# Patient Record
Sex: MALE | Race: WHITE | Employment: FULL TIME | URBAN - METROPOLITAN AREA
[De-identification: names, ages, dates, MRNs, and addresses within clinical notes are randomized per-mention and may not be internally consistent; named-entity substitution may affect disease eponyms.]

---

## 2019-08-21 ENCOUNTER — APPOINTMENT (OUTPATIENT)
Dept: CT IMAGING | Age: 59
End: 2019-08-21
Attending: EMERGENCY MEDICINE
Payer: COMMERCIAL

## 2019-08-21 ENCOUNTER — HOSPITAL ENCOUNTER (EMERGENCY)
Age: 59
Discharge: HOME | End: 2019-08-21
Attending: EMERGENCY MEDICINE
Payer: COMMERCIAL

## 2019-08-21 VITALS
BODY MASS INDEX: 24.65 KG/M2 | HEIGHT: 73 IN | HEART RATE: 86 BPM | SYSTOLIC BLOOD PRESSURE: 131 MMHG | WEIGHT: 186 LBS | DIASTOLIC BLOOD PRESSURE: 98 MMHG | TEMPERATURE: 98.1 F | RESPIRATION RATE: 14 BRPM | OXYGEN SATURATION: 99 %

## 2019-08-21 DIAGNOSIS — K57.32 SIGMOID DIVERTICULITIS: Primary | ICD-10-CM

## 2019-08-21 LAB
ALBUMIN SERPL-MCNC: 3.7 G/DL (ref 3.5–5)
ALBUMIN/GLOB SERPL: 1.1 {RATIO} (ref 1.2–3.5)
ALP SERPL-CCNC: 50 U/L (ref 50–136)
ALT SERPL-CCNC: 35 U/L (ref 12–65)
ANION GAP SERPL CALC-SCNC: 6 MMOL/L (ref 7–16)
AST SERPL-CCNC: 39 U/L (ref 15–37)
BACTERIA URNS QL MICRO: 0 /HPF
BASOPHILS # BLD: 0 K/UL (ref 0–0.2)
BASOPHILS NFR BLD: 1 % (ref 0–2)
BILIRUB SERPL-MCNC: 1.2 MG/DL (ref 0.2–1.1)
BUN SERPL-MCNC: 13 MG/DL (ref 6–23)
CALCIUM SERPL-MCNC: 9 MG/DL (ref 8.3–10.4)
CASTS URNS QL MICRO: ABNORMAL /LPF
CHLORIDE SERPL-SCNC: 104 MMOL/L (ref 98–107)
CO2 SERPL-SCNC: 26 MMOL/L (ref 21–32)
CREAT SERPL-MCNC: 1.32 MG/DL (ref 0.8–1.5)
CRYSTALS URNS QL MICRO: 0 /LPF
DIFFERENTIAL METHOD BLD: ABNORMAL
EOSINOPHIL # BLD: 0 K/UL (ref 0–0.8)
EOSINOPHIL NFR BLD: 1 % (ref 0.5–7.8)
EPI CELLS #/AREA URNS HPF: ABNORMAL /HPF
ERYTHROCYTE [DISTWIDTH] IN BLOOD BY AUTOMATED COUNT: 12.3 % (ref 11.9–14.6)
GLOBULIN SER CALC-MCNC: 3.5 G/DL (ref 2.3–3.5)
GLUCOSE SERPL-MCNC: 96 MG/DL (ref 65–100)
HCT VFR BLD AUTO: 46 % (ref 41.1–50.3)
HGB BLD-MCNC: 16 G/DL (ref 13.6–17.2)
IMM GRANULOCYTES # BLD AUTO: 0 K/UL (ref 0–0.5)
IMM GRANULOCYTES NFR BLD AUTO: 1 % (ref 0–5)
LIPASE SERPL-CCNC: 103 U/L (ref 73–393)
LYMPHOCYTES # BLD: 0.8 K/UL (ref 0.5–4.6)
LYMPHOCYTES NFR BLD: 25 % (ref 13–44)
MCH RBC QN AUTO: 34 PG (ref 26.1–32.9)
MCHC RBC AUTO-ENTMCNC: 34.8 G/DL (ref 31.4–35)
MCV RBC AUTO: 97.7 FL (ref 79.6–97.8)
MONOCYTES # BLD: 0.5 K/UL (ref 0.1–1.3)
MONOCYTES NFR BLD: 15 % (ref 4–12)
MUCOUS THREADS URNS QL MICRO: ABNORMAL /LPF
NEUTS SEG # BLD: 1.9 K/UL (ref 1.7–8.2)
NEUTS SEG NFR BLD: 57 % (ref 43–78)
NRBC # BLD: 0 K/UL (ref 0–0.2)
PLATELET # BLD AUTO: 235 K/UL (ref 150–450)
PMV BLD AUTO: 10.5 FL (ref 9.4–12.3)
POTASSIUM SERPL-SCNC: 4.1 MMOL/L (ref 3.5–5.1)
PROT SERPL-MCNC: 7.2 G/DL (ref 6.3–8.2)
RBC # BLD AUTO: 4.71 M/UL (ref 4.23–5.6)
RBC #/AREA URNS HPF: ABNORMAL /HPF
SODIUM SERPL-SCNC: 136 MMOL/L (ref 136–145)
WBC # BLD AUTO: 3.4 K/UL (ref 4.3–11.1)
WBC URNS QL MICRO: ABNORMAL /HPF

## 2019-08-21 PROCEDURE — 99284 EMERGENCY DEPT VISIT MOD MDM: CPT | Performed by: EMERGENCY MEDICINE

## 2019-08-21 PROCEDURE — 74011250637 HC RX REV CODE- 250/637: Performed by: EMERGENCY MEDICINE

## 2019-08-21 PROCEDURE — 74177 CT ABD & PELVIS W/CONTRAST: CPT

## 2019-08-21 PROCEDURE — 83690 ASSAY OF LIPASE: CPT

## 2019-08-21 PROCEDURE — 81003 URINALYSIS AUTO W/O SCOPE: CPT | Performed by: EMERGENCY MEDICINE

## 2019-08-21 PROCEDURE — 74011250636 HC RX REV CODE- 250/636: Performed by: EMERGENCY MEDICINE

## 2019-08-21 PROCEDURE — 80053 COMPREHEN METABOLIC PANEL: CPT

## 2019-08-21 PROCEDURE — 74011000258 HC RX REV CODE- 258: Performed by: EMERGENCY MEDICINE

## 2019-08-21 PROCEDURE — 74011636320 HC RX REV CODE- 636/320: Performed by: EMERGENCY MEDICINE

## 2019-08-21 PROCEDURE — 81015 MICROSCOPIC EXAM OF URINE: CPT

## 2019-08-21 PROCEDURE — 85025 COMPLETE CBC W/AUTO DIFF WBC: CPT

## 2019-08-21 PROCEDURE — 96360 HYDRATION IV INFUSION INIT: CPT | Performed by: EMERGENCY MEDICINE

## 2019-08-21 RX ORDER — SODIUM CHLORIDE 9 MG/ML
500 INJECTION, SOLUTION INTRAVENOUS ONCE
Status: COMPLETED | OUTPATIENT
Start: 2019-08-21 | End: 2019-08-21

## 2019-08-21 RX ORDER — CIPROFLOXACIN 500 MG/1
500 TABLET ORAL 2 TIMES DAILY
Qty: 20 TAB | Refills: 0 | Status: SHIPPED | OUTPATIENT
Start: 2019-08-21 | End: 2019-08-31

## 2019-08-21 RX ORDER — CIPROFLOXACIN 500 MG/1
500 TABLET ORAL ONCE
Status: COMPLETED | OUTPATIENT
Start: 2019-08-21 | End: 2019-08-21

## 2019-08-21 RX ORDER — METRONIDAZOLE 500 MG/1
500 TABLET ORAL 3 TIMES DAILY
Qty: 30 TAB | Refills: 0 | Status: SHIPPED | OUTPATIENT
Start: 2019-08-21 | End: 2019-08-31

## 2019-08-21 RX ORDER — METRONIDAZOLE 500 MG/1
500 TABLET ORAL
Status: COMPLETED | OUTPATIENT
Start: 2019-08-21 | End: 2019-08-21

## 2019-08-21 RX ORDER — SODIUM CHLORIDE 9 MG/ML
500 INJECTION, SOLUTION INTRAVENOUS ONCE
Status: DISCONTINUED | OUTPATIENT
Start: 2019-08-21 | End: 2019-08-21

## 2019-08-21 RX ORDER — SODIUM CHLORIDE 0.9 % (FLUSH) 0.9 %
10 SYRINGE (ML) INJECTION
Status: COMPLETED | OUTPATIENT
Start: 2019-08-21 | End: 2019-08-21

## 2019-08-21 RX ADMIN — Medication 10 ML: at 13:55

## 2019-08-21 RX ADMIN — DIATRIZOATE MEGLUMINE AND DIATRIZOATE SODIUM 15 ML: 660; 100 LIQUID ORAL; RECTAL at 12:32

## 2019-08-21 RX ADMIN — SODIUM CHLORIDE 500 ML: 900 INJECTION, SOLUTION INTRAVENOUS at 12:50

## 2019-08-21 RX ADMIN — IOPAMIDOL 100 ML: 755 INJECTION, SOLUTION INTRAVENOUS at 13:55

## 2019-08-21 RX ADMIN — CIPROFLOXACIN 500 MG: 500 TABLET, FILM COATED ORAL at 15:31

## 2019-08-21 RX ADMIN — METRONIDAZOLE 500 MG: 500 TABLET, FILM COATED ORAL at 15:31

## 2019-08-21 RX ADMIN — SODIUM CHLORIDE 100 ML: 900 INJECTION, SOLUTION INTRAVENOUS at 13:55

## 2019-08-21 NOTE — ED PROVIDER NOTES
The history is provided by the patient. Abdominal Pain    This is a new problem. Episode onset: 3 days ago. The problem occurs constantly. The problem has been gradually worsening. Associated with: Nausea and loose stool. The pain is located in the generalized abdominal region, LLQ and LUQ. The quality of the pain is sharp and cramping. The pain is moderate. Associated symptoms include a fever and nausea. Pertinent negatives include no diarrhea, no hematochezia, no melena, no vomiting, no dysuria, no hematuria, no chest pain and no back pain. Nothing worsens the pain. The pain is relieved by nothing. No past medical history on file. No past surgical history on file. No family history on file.     Social History     Socioeconomic History    Marital status:      Spouse name: Not on file    Number of children: Not on file    Years of education: Not on file    Highest education level: Not on file   Occupational History    Not on file   Social Needs    Financial resource strain: Not on file    Food insecurity:     Worry: Not on file     Inability: Not on file    Transportation needs:     Medical: Not on file     Non-medical: Not on file   Tobacco Use    Smoking status: Not on file   Substance and Sexual Activity    Alcohol use: Not on file    Drug use: Not on file    Sexual activity: Not on file   Lifestyle    Physical activity:     Days per week: Not on file     Minutes per session: Not on file    Stress: Not on file   Relationships    Social connections:     Talks on phone: Not on file     Gets together: Not on file     Attends Mandaeism service: Not on file     Active member of club or organization: Not on file     Attends meetings of clubs or organizations: Not on file     Relationship status: Not on file    Intimate partner violence:     Fear of current or ex partner: Not on file     Emotionally abused: Not on file     Physically abused: Not on file     Forced sexual activity: Not on file   Other Topics Concern    Not on file   Social History Narrative    Not on file         ALLERGIES: Patient has no known allergies. Review of Systems   Constitutional: Positive for fever. HENT: Negative for congestion and rhinorrhea. Respiratory: Negative for cough. Cardiovascular: Negative for chest pain. Gastrointestinal: Positive for abdominal pain and nausea. Negative for diarrhea, hematochezia, melena and vomiting. Endocrine: Negative for polyuria. Genitourinary: Negative for dysuria and hematuria. Musculoskeletal: Negative for back pain. Vitals:    08/21/19 1117   BP: (!) 132/95   Pulse: 90   Resp: 16   Temp: 97.9 °F (36.6 °C)   SpO2: 98%   Weight: 84.4 kg (186 lb)   Height: 6' 1\" (1.854 m)            Physical Exam   Constitutional: He is oriented to person, place, and time. He appears well-developed and well-nourished. HENT:   Mouth/Throat: Oropharynx is clear and moist. No oropharyngeal exudate. Eyes: Pupils are equal, round, and reactive to light. Conjunctivae are normal.   Neck: Neck supple. Cardiovascular: Normal rate, regular rhythm and normal heart sounds. Pulmonary/Chest: Effort normal and breath sounds normal.   Abdominal: Soft. Bowel sounds are normal. He exhibits no distension. There is generalized tenderness and tenderness in the left upper quadrant and left lower quadrant. There is no rebound and no guarding. Musculoskeletal: Normal range of motion. He exhibits no edema or tenderness. Lymphadenopathy:     He has no cervical adenopathy. Neurological: He is alert and oriented to person, place, and time. Skin: Skin is warm and dry. Nursing note and vitals reviewed. MDM  Number of Diagnoses or Management Options  Diagnosis management comments: Possible diverticulitis or colitis. CT scan ordered. Mild leukopenia. No other symptoms of sepsis. No pulsatile abdominal mass.   Drinks 3-4 times a week but lipase normal.    2:57 PM  Exam and CT consistent with mild diverticulitis. Cipro and Flagyl prescribed. First dose in ED. Clear liquid diet for 12 to 24 hours and advance as tolerated. Amount and/or Complexity of Data Reviewed  Clinical lab tests: ordered and reviewed (Results for orders placed or performed during the hospital encounter of 08/21/19  -CBC WITH AUTOMATED DIFF       Result                      Value             Ref Range           WBC                         3.4 (L)           4.3 - 11.1 K*       RBC                         4.71              4.23 - 5.6 M*       HGB                         16.0              13.6 - 17.2 *       HCT                         46.0              41.1 - 50.3 %       MCV                         97.7              79.6 - 97.8 *       MCH                         34.0 (H)          26.1 - 32.9 *       MCHC                        34.8              31.4 - 35.0 *       RDW                         12.3              11.9 - 14.6 %       PLATELET                    235               150 - 450 K/*       MPV                         10.5              9.4 - 12.3 FL       ABSOLUTE NRBC               0.00              0.0 - 0.2 K/*       DF                          AUTOMATED                             NEUTROPHILS                 57                43 - 78 %           LYMPHOCYTES                 25                13 - 44 %           MONOCYTES                   15 (H)            4.0 - 12.0 %        EOSINOPHILS                 1                 0.5 - 7.8 %         BASOPHILS                   1                 0.0 - 2.0 %         IMMATURE GRANULOCYTES       1                 0.0 - 5.0 %         ABS. NEUTROPHILS            1.9               1.7 - 8.2 K/*       ABS. LYMPHOCYTES            0.8               0.5 - 4.6 K/*       ABS. MONOCYTES              0.5               0.1 - 1.3 K/*       ABS. EOSINOPHILS            0.0               0.0 - 0.8 K/*       ABS. BASOPHILS              0.0               0.0 - 0.2 K/*       ABS. IMM. GRANS.            0.0               0.0 - 0.5 K/*  -METABOLIC PANEL, COMPREHENSIVE       Result                      Value             Ref Range           Sodium                      136               136 - 145 mm*       Potassium                   4.1               3.5 - 5.1 mm*       Chloride                    104               98 - 107 mmo*       CO2                         26                21 - 32 mmol*       Anion gap                   6 (L)             7 - 16 mmol/L       Glucose                     96                65 - 100 mg/*       BUN                         13                6 - 23 MG/DL        Creatinine                  1.32              0.8 - 1.5 MG*       GFR est AA                  >60               >60 ml/min/1*       GFR est non-AA              59 (L)            >60 ml/min/1*       Calcium                     9.0               8.3 - 10.4 M*       Bilirubin, total            1.2 (H)           0.2 - 1.1 MG*       ALT (SGPT)                  35                12 - 65 U/L         AST (SGOT)                  39 (H)            15 - 37 U/L         Alk.  phosphatase            50                50 - 136 U/L        Protein, total              7.2               6.3 - 8.2 g/*       Albumin                     3.7               3.5 - 5.0 g/*       Globulin                    3.5               2.3 - 3.5 g/*       A-G Ratio                   1.1 (L)           1.2 - 3.5      -LIPASE       Result                      Value             Ref Range           Lipase                      103               73 - 393 U/L   -URINE MICROSCOPIC       Result                      Value             Ref Range           WBC                         3-5               0 /hpf              RBC                         0-3               0 /hpf              Epithelial cells            0-3               0 /hpf              Bacteria                    0                 0 /hpf              Casts                       20-50             0 /lpf              Crystals, urine             0                 0 /LPF              Mucus                       1+ (H)            0 /lpf         )  Tests in the radiology section of CPT®: ordered and reviewed (Ct Abd Pelv W Cont    Result Date: 8/21/2019  CLINICAL HISTORY: The patient is a 64years year old Male presenting with symptoms of left Sided abdominal pain, history of fever-diverticulitis? Technique: Thin slice axial images were obtained through the abdomen and pelvis with intravenous and with oral contrast.  Coronal reformatted images were also provided for review. A total of 100 ml of Iopamidol (ISOVUE-370) 76 % contrast was administered intravenously. All CT scans at this facility are performed using dose reduction/dose modulation techniques, as appropriate the performed exam, including the following: Automated Exposure Control; Adjustment of the mA and/or kV according to patient size (this includes techniques or standardized protocols for targeted exams where dose is matched to indication/reason for exam); and Use of Iterative Reconstruction Technique. Radiation Exposure Indices: Reference Air Kerma (Ka,r) = 572 mGy-cm COMPARISON:  None. FINDINGS:  Abdomen: Lung bases: Clear without evidence of focal infiltrate, consolidation, or effusion. Liver: The liver is enlarged and 17 cm with diffuse fatty infiltrate. Few small subcentimeter hypodense lesions are seen likely representing cysts however too small to characterize Biliary: Unremarkable gallbladder. No evidence of intra or extrahepatic biliary dilatation. Pancreas: Normal in size and contour without focal lesion. Spleen: Normal in size and contour without focal lesion. Adrenal glands: Normal in size without focal lesion. Kidneys: Symmetric without evidence of hydronephrosis or nephrolithiasis. Normal enhancement throughout all visualized phases of contrast excretion. Bowel: There are numerous sigmoid diverticula.  Slight strandy changes are seen in the fat surrounding the proximal sigmoid colon a component of mild diverticulitis cannot be excluded. The appendix is not visualized, however there is no evidence of pericecal inflammatory process. Retroperitoneum/vasculature: No evidence of significant adenopathy. Unremarkable aorta and IVC. Abdominal soft tissues: Unremarkable. Osseous structures: No acute osseous abnormality. Pelvis: Unremarkable bladder. No significant adenopathy or free fluid. . The prostate is unremarkable     IMPRESSION: 1. Numerous sigmoid diverticula with slight strandy changes in the fat surrounding the proximal sigmoid colon. Mild diverticulitis cannot be excluded. Excellent 2. Fatty liver infiltrate with probable small cysts. 3. Hepatomegaly.  CPT code(s): O0022325, [id number]    )           Procedures

## 2019-08-21 NOTE — ED NOTES
I have reviewed discharge instructions with the patient. The patient verbalized understanding. Patient left ED via Discharge Method: ambulatory to Home with self. Opportunity for questions and clarification provided. Patient given 2 scripts. No e -sign        To continue your aftercare when you leave the hospital, you may receive an automated call from our care team to check in on how you are doing. This is a free service and part of our promise to provide the best care and service to meet your aftercare needs.  If you have questions, or wish to unsubscribe from this service please call [phone]. Thank you for Choosing our [facility].

## 2019-08-21 NOTE — DISCHARGE INSTRUCTIONS
Patient Education   Clear liquid diet for 24 hours. Begin Cipro and Flagyl again later this evening. Cipro twice daily for 10 days. Flagyl 3 times daily for 10 days. Tylenol and ibuprofen for pain. Tylenol 500 to 1000 mg every 6 hours for pain. Ibuprofen 400 mg every 6 hours for pain. You may alternate these every 3-4 hours for best results. Return if any new, worsening or concerning symptoms. Follow-up with your doctor or the doctor provided in 3 to 5 days if not improving. Diverticulitis: Care Instructions  Your Care Instructions    Diverticulitis occurs when pouches form in the wall of the colon and become inflamed or infected. It can be very painful. Doctors aren't sure what causes diverticulitis. There is no proof that foods such as nuts, seeds, or berries cause it or make it worse. A low-fiber diet may cause the colon to work harder to push stool forward. Pouches may form because of this extra work. It may be hard to think about healthy eating while you're in pain. But as you recover, you might think about how you can use healthy eating for overall better health. Healthy eating may help you avoid future attacks. Follow-up care is a key part of your treatment and safety. Be sure to make and go to all appointments, and call your doctor if you are having problems. It's also a good idea to know your test results and keep a list of the medicines you take. How can you care for yourself at home? · Drink plenty of fluids, enough so that your urine is light yellow or clear like water. If you have kidney, heart, or liver disease and have to limit fluids, talk with your doctor before you increase the amount of fluids you drink. · Stick to liquids or a bland diet (plain rice, bananas, dry toast or crackers, applesauce) until you are feeling better. Then you can return to regular foods and gradually increase the amount of fiber in your diet.   · Use a heating pad set on low on your belly to relieve mild cramps and pain. · Get extra rest until you are feeling better. · Be safe with medicines. Read and follow all instructions on the label. ? If the doctor gave you a prescription medicine for pain, take it as prescribed. ? If you are not taking a prescription pain medicine, ask your doctor if you can take an over-the-counter medicine. · If your doctor prescribed antibiotics, take them as directed. Do not stop taking them just because you feel better. You need to take the full course of antibiotics. To prevent future attacks of diverticulitis  · Avoid constipation:  ? Include fruits, vegetables, beans, and whole grains in your diet each day. These foods are high in fiber. ? Drink plenty of fluids, enough so that your urine is light yellow or clear like water. If you have kidney, heart, or liver disease and have to limit fluids, talk with your doctor before you increase the amount of fluids you drink. ? Get some exercise every day. Build up slowly to 30 to 60 minutes a day on 5 or more days of the week. ? Take a fiber supplement, such as Citrucel or Metamucil, every day if needed. Read and follow all instructions on the label. ? Schedule time each day for a bowel movement. Having a daily routine may help. Take your time and do not strain when having a bowel movement. When should you call for help? Call your doctor now or seek immediate medical care if:    · You have a fever.     · You are vomiting.     · You have new or worse belly pain.     · You cannot pass stools or gas.    Watch closely for changes in your health, and be sure to contact your doctor if you have any problems. Where can you learn more? Go to http://joby-ondina.info/. Enter H901 in the search box to learn more about \"Diverticulitis: Care Instructions. \"  Current as of: November 7, 2018  Content Version: 12.1  © 1937-6682 [employer], [employer].  Care instructions adapted under license by [employer] (which disclaims liability or warranty for this information). If you have questions about a medical condition or this instruction, always ask your healthcare professional. [address] any warranty or liability for your use of this information.

## 2022-09-13 ENCOUNTER — OFFICE VISIT (OUTPATIENT)
Dept: ORTHOPEDIC SURGERY | Age: 62
End: 2022-09-13
Payer: COMMERCIAL

## 2022-09-13 VITALS — HEIGHT: 73 IN | WEIGHT: 189.4 LBS | BODY MASS INDEX: 25.1 KG/M2

## 2022-09-13 DIAGNOSIS — M75.121 NONTRAUMATIC COMPLETE TEAR OF RIGHT ROTATOR CUFF: ICD-10-CM

## 2022-09-13 DIAGNOSIS — M19.011 DEGENERATIVE JOINT DISEASE OF RIGHT ACROMIOCLAVICULAR JOINT: ICD-10-CM

## 2022-09-13 DIAGNOSIS — M05.712 RHEUMATOID ARTHRITIS INVOLVING LEFT SHOULDER WITH POSITIVE RHEUMATOID FACTOR (HCC): ICD-10-CM

## 2022-09-13 DIAGNOSIS — M75.21 BICIPITAL TENDINITIS OF RIGHT SHOULDER: ICD-10-CM

## 2022-09-13 DIAGNOSIS — M05.711 RHEUMATOID ARTHRITIS INVOLVING RIGHT SHOULDER WITH POSITIVE RHEUMATOID FACTOR (HCC): Primary | ICD-10-CM

## 2022-09-13 DIAGNOSIS — M19.012 DEGENERATIVE JOINT DISEASE OF LEFT ACROMIOCLAVICULAR JOINT: ICD-10-CM

## 2022-09-13 DIAGNOSIS — S43.431A SUPERIOR GLENOID LABRUM LESION OF RIGHT SHOULDER, INITIAL ENCOUNTER: ICD-10-CM

## 2022-09-13 PROCEDURE — 99204 OFFICE O/P NEW MOD 45 MIN: CPT | Performed by: ORTHOPAEDIC SURGERY

## 2022-09-13 RX ORDER — MELOXICAM 15 MG/1
15 TABLET ORAL DAILY
COMMUNITY

## 2022-09-13 NOTE — PROGRESS NOTES
Name: Kallie Posada  YOB: 1960  Gender: male  MRN: [id number]      What: Bilateral shoulder pain right greater than left  How: Insidious onset  When: Long duration    Referring provider: Hussein Cisneros    HPI: Kallie Posada is a 63 y.o. right-hand-dominant male seen at the request of Hussein Cisneros for bilateral shoulder pain right greater than left. He has a history of rheumatoid arthritis. He is on Enbrel, methotrexate and Mobic. He has a history of right shoulder pain that is flared up recently. He was injected by Hussein Cisneros last week and placed on oral steroids. He has an MRI of the right shoulder. Since he is on the medication he is doing better. ROS/Meds/PSH/PMH/FH/SH: A ten system review of systems was performed and is negative other than what is in the HPI. Tobacco:  reports that he has been smoking cigars. He has never used smokeless tobacco.  Ht 6' 1\" (1.854 m)   Wt 189 lb 6.4 oz (85.9 kg)   BMI 24.99 kg/m²      Physical Examination:  He is awake alert pleasant gentleman ambulating without difficulty    The left shoulder has 0 to 180 degrees of active and 0 to 180 degrees passive forward elevation. Pain in the overhead position  Internal rotation is to T6. External rotation is to 60 degrees at the side. In the 90 degree abducted position 90 degrees of external and 90 degrees internal rotation  The AC joint is tender  SC joint is non-tender. Greater tuberosity is non-tender. negative biceps  Negative O'Briens sign  negative lift-off sign  Negative belly press sign  Negative bear huggers sign  negative drop sign  negative hornblower's sign  No posterior glenohumeral joint line tenderness. No evident excessive external rotation  Rotator cuff strength is 5/5. Positive external rotation stress test.   Positive empty can sign  There is no evident anterior or posterior apprehension with a negative sulcus sign.    No instability  negative external and internal Rotation lag sign  Neurovascularly intact. The right shoulder has 0 to 180 degrees of active and 0 to 180 degrees passive forward elevation. Internal rotation is to T6. External rotation is to 60 degrees at the side. In the 90 degree abducted position 90 degrees of external and 90 degrees internal rotation  The AC joint is tender  SC joint is non-tender. Greater tuberosity is non-tender. Positive bicipital stress test negative Leon sign  Negative O'Briens sign  negative lift-off sign  Negative belly press sign  Negative bear huggers sign  negative drop sign  negative hornblower's sign  No posterior glenohumeral joint line tenderness. No evident excessive external rotation  Rotator cuff strength is 5/5 with weakness  negative external rotation stress test.   Negative empty can sign  There is no evident anterior or posterior apprehension with a negative sulcus sign. No instability  negative external and internal Rotation lag sign  Neurovascularly intact. Data Reviewed:        XR: AP AP in the scapular outlet throwers and axillary views both shoulders   Clinical Indication    ICD-10-CM    1. Rheumatoid arthritis involving right shoulder with positive rheumatoid factor ([address]  M05.711       2. Nontraumatic complete tear of right rotator cuff  M75.121 XR SHOULDER BILATERAL STANDARD      3. Bicipital tendinitis of right shoulder  M75.21       4. Superior glenoid labrum lesion of right shoulder, initial encounter  S43.431A       5. Degenerative joint disease of right acromioclavicular joint  M19.011       6. Rheumatoid arthritis involving left shoulder with positive rheumatoid factor ([address]  M05.712       7. Degenerative joint disease of left acromioclavicular joint  M19.012          Report: AP AP scapular outlet throwers and axillary views right shoulder demonstrated type II acromion degenerative changes AC joint. Early degenerative changes in the glenohumeral joint. No fracture.   No dislocation    AP AP scapular outlet throwers and axillary views left shoulder demonstrate a type II acromion. [facility]. Early degenerative changes in the glenohumeral joint. No fracture. No dislocation. Impression: As above   Devan Saleh MD         MRI right shoulder dated 4/28/2022 demonstrates a type II acromion. Degenerative changes in the [facility] joint. A full-thickness complex retracted rotator cuff tear. Evidence of a SLAP tear and biceps tendinopathy. Early degenerative changes in the glenohumeral joint. Impression:   1. Rheumatoid arthritis involving right shoulder with positive rheumatoid factor (HCC)    2. Nontraumatic complete tear of right rotator cuff    3. Bicipital tendinitis of right shoulder    4. Superior glenoid labrum lesion of right shoulder, initial encounter    5. Degenerative joint disease of right acromioclavicular joint    6. Rheumatoid arthritis involving left shoulder with positive rheumatoid factor (HCC)    7. Degenerative joint disease of left acromioclavicular joint           Plan:   I discussed the problem with the patient. I discussed nonoperative versus operative intervention including injections. He had a recent right shoulder injection we will defer that for now. He is currently on oral steroids and feeling well. We discussed potential rotator cuff repair versus total shoulder arthroplasty most likely a reverse. We will defer that for now. I would like to see how he responds off steroids. So observe him for now. I will recheck him back in 6 weeks. 4 This is a chronic illness/condition with exacerbation and progression    Follow up: No follow-ups on file.            Devan Saleh MD

## 2022-10-26 ENCOUNTER — OFFICE VISIT (OUTPATIENT)
Dept: ORTHOPEDIC SURGERY | Age: 62
End: 2022-10-26
Payer: COMMERCIAL

## 2022-10-26 DIAGNOSIS — M75.121 NONTRAUMATIC COMPLETE TEAR OF RIGHT ROTATOR CUFF: ICD-10-CM

## 2022-10-26 DIAGNOSIS — M05.711 RHEUMATOID ARTHRITIS INVOLVING RIGHT SHOULDER WITH POSITIVE RHEUMATOID FACTOR (HCC): Primary | ICD-10-CM

## 2022-10-26 DIAGNOSIS — S43.431A SUPERIOR GLENOID LABRUM LESION OF RIGHT SHOULDER, INITIAL ENCOUNTER: ICD-10-CM

## 2022-10-26 DIAGNOSIS — M05.712 RHEUMATOID ARTHRITIS INVOLVING LEFT SHOULDER WITH POSITIVE RHEUMATOID FACTOR (HCC): ICD-10-CM

## 2022-10-26 DIAGNOSIS — M19.011 DEGENERATIVE JOINT DISEASE OF RIGHT ACROMIOCLAVICULAR JOINT: ICD-10-CM

## 2022-10-26 DIAGNOSIS — M75.21 BICIPITAL TENDINITIS OF RIGHT SHOULDER: ICD-10-CM

## 2022-10-26 DIAGNOSIS — M19.012 DEGENERATIVE JOINT DISEASE OF LEFT ACROMIOCLAVICULAR JOINT: ICD-10-CM

## 2022-10-26 PROCEDURE — 99214 OFFICE O/P EST MOD 30 MIN: CPT | Performed by: ORTHOPAEDIC SURGERY

## 2022-10-26 PROCEDURE — 20610 DRAIN/INJ JOINT/BURSA W/O US: CPT | Performed by: ORTHOPAEDIC SURGERY

## 2022-10-26 NOTE — PROGRESS NOTES
Name: Aline Morales  YOB: 1960  Gender: male  MRN: [id number]          HPI: Aline Morales is a 63 y.o. right-hand-dominant male seen for bilateral shoulder pain right greater than left. Both shoulders are bothering him. ROS/Meds/PSH/PMH/FH/SH: A ten system review of systems was performed and is negative other than what is in the HPI. Tobacco:  reports that he has been smoking cigars. He has never used smokeless tobacco.  There were no vitals taken for this visit. Physical Examination:  He is awake alert pleasant gentleman ambulating without difficulty    The left shoulder has 0 to 180 degrees of active and 0 to 180 degrees passive forward elevation. Pain in the overhead position  Internal rotation is to T6. External rotation is to 60 degrees at the side. In the 90 degree abducted position 90 degrees of external and 90 degrees internal rotation  The AC joint is tender  SC joint is non-tender. Greater tuberosity is non-tender. negative biceps  Negative O'Briens sign  negative lift-off sign  Negative belly press sign  Negative bear huggers sign  negative drop sign  negative hornblower's sign  No posterior glenohumeral joint line tenderness. No evident excessive external rotation  Rotator cuff strength is 5/5. Positive external rotation stress test.   Positive empty can sign  There is no evident anterior or posterior apprehension with a negative sulcus sign. No instability  negative external and internal Rotation lag sign  Neurovascularly intact. The right shoulder has 0 to 180 degrees of active and 0 to 180 degrees passive forward elevation. Internal rotation is to T6. External rotation is to 60 degrees at the side. In the 90 degree abducted position 90 degrees of external and 90 degrees internal rotation  The AC joint is tender  SC joint is non-tender. Greater tuberosity is non-tender.   Positive bicipital stress test negative Leon sign  Negative O'Briens sign  negative lift-off sign  Negative belly press sign  Negative bear huggers sign  negative drop sign  negative hornblower's sign  No posterior glenohumeral joint line tenderness. No evident excessive external rotation  Rotator cuff strength is 5/5 with weakness  negative external rotation stress test.   Negative empty can sign  There is no evident anterior or posterior apprehension with a negative sulcus sign. No instability  negative external and internal Rotation lag sign  Neurovascularly intact. Data Reviewed:    Minor procedure:      OFFICE PROCEDURE PROGRESS NOTE    Chart reviewed for the following:   Aaliyah Sewell MD, have reviewed the History, Physical and updated the Allergic reactions for Areatha Doreen performed immediately prior to start of procedure:   Aaliyah Sewell MD, have performed the following reviews on Danella Merna prior to the start of the procedure:            * Patient was identified by name and date of birth   * Agreement on procedure being performed was verified  * Risks and Benefits explained to the patient  * Procedure site verified and marked as necessary  * Patient was positioned for comfort     Time: 12:15 PM  Date of procedure: 10/26/2022  Procedure performed by:  Karlos Rahman MD    After sterile prep and drape of the right shoulder, the patient received a 9:1 injection into the subacromial space. It consisted of 4.5 mL of 2% Xylocaine, 4.5 mL of 0.5% bupivacaine and 80 mg of Depo-Medrol. A sterile dressing was applied. The patient tolerated the procedure well.          OFFICE PROCEDURE PROGRESS NOTE    Chart reviewed for the following:   Aaliyah Sewell MD, have reviewed the History, Physical and updated the Allergic reactions for Arealoriea Doreen performed immediately prior to start of procedure:   Aaliyah Sewell MD, have performed the following reviews on Danella Merna prior to the start of the procedure:            * Patient was identified by name and date of birth   * Agreement on procedure being performed was verified  * Risks and Benefits explained to the patient  * Procedure site verified and marked as necessary  * Patient was positioned for comfort     Time: 12:15 PM  Date of procedure: 10/26/2022  Procedure performed by:  Gustavo Holland MD    After sterile prep and drape of the left shoulder, the patient received a 9:1 injection into the subacromial space. It consisted of 4.5 mL of 2% Xylocaine, 4.5 mL of 0.5% bupivacaine and 80 mg of Depo-Medrol. A sterile dressing was applied. The patient tolerated the procedure well. Plan:   I discussed the problem with the patient. I discussed nonoperative versus operative intervention including injections. Discussed rotator cuff repair versus reverse right total shoulder arthroplasty. We will defer surgery for now. I injected both shoulders. We will observe him. I will recheck him back in 3 months  4 This is a chronic illness/condition with exacerbation and progression    Follow up: No follow-ups on file.            Gustavo Holland MD

## 2023-01-11 ENCOUNTER — TELEPHONE (OUTPATIENT)
Dept: ORTHOPEDIC SURGERY | Age: 63
End: 2023-01-11

## 2023-01-11 NOTE — TELEPHONE ENCOUNTER
He came in for an injection in his shoulders and is in excruciating pain. Please call to discuss what he can do. He is also having some severe neck pain.
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
## 2023-01-13 ENCOUNTER — TELEPHONE (OUTPATIENT)
Dept: ORTHOPEDIC SURGERY | Age: 63
End: 2023-01-13

## 2023-01-13 DIAGNOSIS — M54.2 NECK PAIN: Primary | ICD-10-CM

## 2023-01-13 NOTE — TELEPHONE ENCOUNTER
Referral sent to the Spine Team and patient was notified. Also notified him that if OTC meds were not helping, he should go to the ER.

## 2023-01-13 NOTE — TELEPHONE ENCOUNTER
He says he is having neck issues and Dr. Brett Crisostomo said he needed to see a specialist. He is in terrible pain and is asking for a return call.

## 2024-07-01 ENCOUNTER — OFFICE VISIT (OUTPATIENT)
Age: 64
End: 2024-07-01
Payer: COMMERCIAL

## 2024-07-01 VITALS — BODY MASS INDEX: 26.55 KG/M2 | WEIGHT: 196 LBS | HEIGHT: 72 IN

## 2024-07-01 DIAGNOSIS — M72.0 DUPUYTREN'S DISEASE: Primary | ICD-10-CM

## 2024-07-01 PROCEDURE — 99204 OFFICE O/P NEW MOD 45 MIN: CPT | Performed by: ORTHOPAEDIC SURGERY

## 2024-07-01 NOTE — PROGRESS NOTES
information provided and the formulated plan. All questions were answered to the patient's satisfaction during the encounter.      Zonia Wharton MD  Orthopaedic Surgery  07/01/24  12:13 PM

## 2024-07-10 DIAGNOSIS — M72.0 DUPUYTREN'S DISEASE: Primary | ICD-10-CM

## 2024-07-10 DIAGNOSIS — M72.0 DUPUYTREN'S CONTRACTURE: ICD-10-CM

## 2024-08-27 ENCOUNTER — TELEPHONE (OUTPATIENT)
Dept: ORTHOPEDIC SURGERY | Age: 64
End: 2024-08-27

## 2024-08-28 ENCOUNTER — TELEPHONE (OUTPATIENT)
Dept: ORTHOPEDIC SURGERY | Age: 64
End: 2024-08-28

## 2024-08-28 NOTE — TELEPHONE ENCOUNTER
He is asking to speak to Bridgette. He has three different things from [facility] with three dates for his surgery. He wants to confirm with Bridgette.

## 2024-08-28 NOTE — TELEPHONE ENCOUNTER
I spoke with patient, advised him  that his surgery is scheduled for 9/27/2024 at the [facility] surgery center located at [address] [facility].  he will have a phone pre-assessment where they will call him a few days before his surgery to go over various health questions.  The OR will call him the business day before  his surgery (usually around 2:00 p.m.) to let him know what time he  needs to arrive the day of surgery.  I advised him he has an O.T. appointment with Shanae on 10/4/2024 at 8:10 a.m.  I also advised him that his post op appointment with Dr. Wharton is scheduled on 10/8/2024  at 9:00 a.m. at our [facility] office.  Patient voiced understanding.

## 2024-09-13 RX ORDER — METOPROLOL SUCCINATE 50 MG/1
50 TABLET, EXTENDED RELEASE ORAL DAILY
COMMUNITY
Start: 2024-07-25

## 2024-09-13 RX ORDER — PREDNISONE 5 MG/1
1 TABLET ORAL PRN
COMMUNITY
Start: 2023-08-16

## 2024-09-13 NOTE — PERIOP NOTE
Phone pre-assessment completed.    Verified name & . Order to obtain consent not found in EHR , however patient verifies case posting.    Type 1A surgery,  assessment complete.  Orders  received.    Labs per surgeon: unknown  Labs per anesthesia protocol: none    Covid + 24 denies respiratory symptoms but states was diagnosed with sinus infection and is almost finished with antibiotics. Discussed with Zackery BURT and states patient may proceed as scheduled unless he develops a fever or develops cough. Pt verbalizes understanding that he should contact the surgeon and have the surgey delayed x 1 week and must be completely well before surgery.    Patient answered medical/surgical history questions at their best of ability. All prior to admission medications documented in EPIC.    Patient instructed to continue all prescribed medications unless otherwise instructed below:    Prescription meds to hold: None, however with discussion  Pt states he has been holding methotrexate and will continue to hold until after surgery.     Please stop all vitamins & supplements 7 days prior to surgery and stop all NSAIDS ( ASA/Excedrin/BC & Goody Powder, ibuprofen/Motrin/Advil, naproxen/Aleve) 5 days before your surgery. Should you have a surgery date that does not allow for the amount of time instructed above, please stop taking vitamins, supplements, and NSAIDS IMMEDIATELY.    Patient instructed to take ONLY the following medications day of surgery per anesthesia guidelines with sip of water: metoprolol and omeprazole .Continue all prescription medication the day/night prior to surgery unless other wise instructed above.    If you have never been diagnosed with liver disease, take Acetaminophen 1000mg in the morning and then again before bed one day prior to surgery date. You may substitute Tylenol Regular Strength.    Instructed on the following:    > Arrive at [address] [place] [address] ([address])[place] [phone].

## 2024-09-16 ENCOUNTER — OFFICE VISIT (OUTPATIENT)
Age: 64
End: 2024-09-16
Payer: COMMERCIAL

## 2024-09-16 DIAGNOSIS — M72.0 DUPUYTREN'S DISEASE: Primary | ICD-10-CM

## 2024-09-16 PROCEDURE — 99213 OFFICE O/P EST LOW 20 MIN: CPT | Performed by: ORTHOPAEDIC SURGERY

## 2024-09-16 NOTE — H&P (VIEW-ONLY)
Orthopaedic Hand Clinic Note    Name: Rian Harmon  YOB: 1960  Gender: male  MRN: [id number]      CC: Patient referred for evaluation of upper extremity pain    HPI: Rian Harmon is a 64 y.o. male with a chief complaint of dupuytren's contractures of bilateral ring fingers, left is more significant than the right. He is ready to proceed with surgery      ROS/Meds/PSH/PMH/FH/SH: I personally reviewed the patients standard intake form.  Pertinents are discussed in the HPI    Physical Examination:    Musculoskeletal Exam:  Examination on the bilateral upper extremity demonstrates cap refill < 5 seconds in all fingers, there are Dupuytren's cords in line with bilateral ring metacarpals.  There is a contracture of the right ring metacarpophalangeal joint approximately 30 degrees, no right ring PIP contracture.  There is a 40 degree contracture of the left ring MCP and a 60 degree contracture of the left ring PIP.    Imaging / Electrodiagnostic Tests:     none    Assessment:     ICD-10-CM    1. Dupuytren's disease  M72.0           Plan:   We discussed the diagnosis and different treatment options. We discussed observation, therapy and other pertinent treatment modalities including Xiaflex injection and surgical treatment.    After discussing in detail the patient elects to proceed with surgical treatment.    Patient understands risks and benefits of left ring finger Dupuytren's contracture release including but not limited to nerve injury, vessel injury, infection, failure to achieve desired results and possible need for additional surgery. Patient understands and wishes to proceed with surgery.     On Exam:   The patient is alert and oriented  Cardiovascular: regular rate and rhythm  Respiratory: Non labored breathing       Patient voiced accordance and understanding of the information provided and the formulated plan. All questions were answered to the patient's satisfaction during the

## 2024-09-25 NOTE — PERIOP NOTE
Patient called back stating that he had a second appt with Dr Wharton (Pre-OP appointment) and was then told to call back to complete another pre-assessment. Chart reviewed. No changes, see previous PAT noted from this RN 9/13/24. All instructions reviewed and patient verbalizes understanding of instructions.

## 2024-09-26 ENCOUNTER — TELEPHONE (OUTPATIENT)
Age: 64
End: 2024-09-26

## 2024-09-26 ENCOUNTER — ANESTHESIA EVENT (OUTPATIENT)
Dept: SURGERY | Age: 64
End: 2024-09-26
Payer: COMMERCIAL

## 2024-09-26 NOTE — PERIOP NOTE
Preop department called to notify patient of arrival time for scheduled procedure. Instructions given to   - Arrive at [facility] Entrance [address]  - Remain NPO after midnight, unless otherwise indicated, including gum, mints, and ice chips.   - Have a responsible adult to drive patient to the hospital, stay during surgery, and patient will need supervision 24 hours after anesthesia.   - Use antibacterial soap in shower the night before surgery and on the morning of surgery.       Was patient contacted: YES  Voicemail left:   Numbers contacted: [phone]   Arrival time: 0800

## 2024-09-27 ENCOUNTER — ANESTHESIA (OUTPATIENT)
Dept: SURGERY | Age: 64
End: 2024-09-27
Payer: COMMERCIAL

## 2024-10-01 ENCOUNTER — TELEPHONE (OUTPATIENT)
Dept: ORTHOPEDIC SURGERY | Age: 64
End: 2024-10-01

## 2024-10-01 NOTE — TELEPHONE ENCOUNTER
I left a VM for patient advising him to please call me back to reschedule his surgery and therapy appointments.  I advised when he calls back, he needs  to let us know whether or not he has power and that the first available date for surgery is 10/9/2024.
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
## 2024-10-02 ENCOUNTER — TELEPHONE (OUTPATIENT)
Age: 64
End: 2024-10-02

## 2024-10-02 NOTE — TELEPHONE ENCOUNTER
I lvm for Mr. Harmon with the updated sx and post-op info.  He may call back if he has questions.

## 2024-10-08 NOTE — PERIOP NOTE
Preop department called to notify patient of arrival time for scheduled procedure. Instructions given to   - Arrive at [facility] Entrance [address].  - Remain NPO after midnight, unless otherwise indicated, including gum, mints, and ice chips.   - Have a responsible adult to drive patient to the hospital, stay during surgery, and patient will need supervision 24 hours after anesthesia.   - Use antibacterial soap in shower the night before surgery and on the morning of surgery.       Was patient contacted: yes-pt   Voicemail left:   Numbers contacted: [phone]   Arrival time: 0730

## 2024-10-09 ENCOUNTER — HOSPITAL ENCOUNTER (OUTPATIENT)
Age: 64
Setting detail: OUTPATIENT SURGERY
Discharge: HOME | End: 2024-10-09
Attending: ORTHOPAEDIC SURGERY | Admitting: ORTHOPAEDIC SURGERY
Payer: COMMERCIAL

## 2024-10-09 VITALS
BODY MASS INDEX: 26.11 KG/M2 | WEIGHT: 197 LBS | TEMPERATURE: 98.4 F | HEIGHT: 73 IN | DIASTOLIC BLOOD PRESSURE: 85 MMHG | RESPIRATION RATE: 18 BRPM | OXYGEN SATURATION: 94 % | HEART RATE: 58 BPM | SYSTOLIC BLOOD PRESSURE: 134 MMHG

## 2024-10-09 DIAGNOSIS — M72.0 DUPUYTREN'S CONTRACTURE: Primary | ICD-10-CM

## 2024-10-09 PROCEDURE — 7100000010 HC PHASE II RECOVERY - FIRST 15 MIN: Performed by: ORTHOPAEDIC SURGERY

## 2024-10-09 PROCEDURE — 7100000000 HC PACU RECOVERY - FIRST 15 MIN: Performed by: ORTHOPAEDIC SURGERY

## 2024-10-09 PROCEDURE — 2580000003 HC RX 258

## 2024-10-09 PROCEDURE — 2709999900 HC NON-CHARGEABLE SUPPLY: Performed by: ORTHOPAEDIC SURGERY

## 2024-10-09 PROCEDURE — 2720000010 HC SURG SUPPLY STERILE: Performed by: ORTHOPAEDIC SURGERY

## 2024-10-09 PROCEDURE — 3700000001 HC ADD 15 MINUTES (ANESTHESIA): Performed by: ORTHOPAEDIC SURGERY

## 2024-10-09 PROCEDURE — 2500000003 HC RX 250 WO HCPCS

## 2024-10-09 PROCEDURE — 3600000002 HC SURGERY LEVEL 2 BASE: Performed by: ORTHOPAEDIC SURGERY

## 2024-10-09 PROCEDURE — 6360000002 HC RX W HCPCS: Performed by: ORTHOPAEDIC SURGERY

## 2024-10-09 PROCEDURE — 3600000012 HC SURGERY LEVEL 2 ADDTL 15MIN: Performed by: ORTHOPAEDIC SURGERY

## 2024-10-09 PROCEDURE — 3700000000 HC ANESTHESIA ATTENDED CARE: Performed by: ORTHOPAEDIC SURGERY

## 2024-10-09 PROCEDURE — 6360000002 HC RX W HCPCS

## 2024-10-09 PROCEDURE — 7100000001 HC PACU RECOVERY - ADDTL 15 MIN: Performed by: ORTHOPAEDIC SURGERY

## 2024-10-09 RX ORDER — SODIUM CHLORIDE 9 MG/ML
INJECTION, SOLUTION INTRAVENOUS PRN
Status: DISCONTINUED | OUTPATIENT
Start: 2024-10-09 | End: 2024-10-09 | Stop reason: HOSPADM

## 2024-10-09 RX ORDER — SODIUM CHLORIDE 0.9 % (FLUSH) 0.9 %
5-40 SYRINGE (ML) INJECTION EVERY 12 HOURS SCHEDULED
Status: DISCONTINUED | OUTPATIENT
Start: 2024-10-09 | End: 2024-10-09 | Stop reason: HOSPADM

## 2024-10-09 RX ORDER — MIDAZOLAM HYDROCHLORIDE 2 MG/2ML
2 INJECTION, SOLUTION INTRAMUSCULAR; INTRAVENOUS
Status: DISCONTINUED | OUTPATIENT
Start: 2024-10-09 | End: 2024-10-09 | Stop reason: HOSPADM

## 2024-10-09 RX ORDER — SODIUM CHLORIDE 0.9 % (FLUSH) 0.9 %
5-40 SYRINGE (ML) INJECTION PRN
Status: DISCONTINUED | OUTPATIENT
Start: 2024-10-09 | End: 2024-10-09 | Stop reason: HOSPADM

## 2024-10-09 RX ORDER — MIDAZOLAM HYDROCHLORIDE 1 MG/ML
INJECTION INTRAMUSCULAR; INTRAVENOUS
Status: DISCONTINUED | OUTPATIENT
Start: 2024-10-09 | End: 2024-10-09 | Stop reason: SDUPTHER

## 2024-10-09 RX ORDER — HYDROMORPHONE HYDROCHLORIDE 2 MG/ML
0.5 INJECTION, SOLUTION INTRAMUSCULAR; INTRAVENOUS; SUBCUTANEOUS EVERY 5 MIN PRN
Status: DISCONTINUED | OUTPATIENT
Start: 2024-10-09 | End: 2024-10-09 | Stop reason: HOSPADM

## 2024-10-09 RX ORDER — OXYCODONE HYDROCHLORIDE 5 MG/1
5 TABLET ORAL
Status: DISCONTINUED | OUTPATIENT
Start: 2024-10-09 | End: 2024-10-09 | Stop reason: HOSPADM

## 2024-10-09 RX ORDER — HYDROMORPHONE HYDROCHLORIDE 2 MG/ML
0.5 INJECTION, SOLUTION INTRAMUSCULAR; INTRAVENOUS; SUBCUTANEOUS EVERY 5 MIN PRN
Status: DISCONTINUED | OUTPATIENT
Start: 2024-10-09 | End: 2024-10-09 | Stop reason: SDUPTHER

## 2024-10-09 RX ORDER — NALOXONE HYDROCHLORIDE 0.4 MG/ML
INJECTION, SOLUTION INTRAMUSCULAR; INTRAVENOUS; SUBCUTANEOUS PRN
Status: DISCONTINUED | OUTPATIENT
Start: 2024-10-09 | End: 2024-10-09 | Stop reason: HOSPADM

## 2024-10-09 RX ORDER — OXYCODONE AND ACETAMINOPHEN 5; 325 MG/1; MG/1
1 TABLET ORAL EVERY 6 HOURS PRN
Qty: 20 TABLET | Refills: 0 | Status: SHIPPED | OUTPATIENT
Start: 2024-10-09 | End: 2024-10-16

## 2024-10-09 RX ORDER — LIDOCAINE HYDROCHLORIDE 10 MG/ML
1 INJECTION, SOLUTION INFILTRATION; PERINEURAL
Status: DISCONTINUED | OUTPATIENT
Start: 2024-10-09 | End: 2024-10-09 | Stop reason: HOSPADM

## 2024-10-09 RX ORDER — OXYCODONE HYDROCHLORIDE 5 MG/1
10 TABLET ORAL PRN
Status: DISCONTINUED | OUTPATIENT
Start: 2024-10-09 | End: 2024-10-09 | Stop reason: HOSPADM

## 2024-10-09 RX ORDER — ACETAMINOPHEN 500 MG
1000 TABLET ORAL ONCE
Status: DISCONTINUED | OUTPATIENT
Start: 2024-10-09 | End: 2024-10-09 | Stop reason: HOSPADM

## 2024-10-09 RX ORDER — PROPOFOL 10 MG/ML
INJECTION, EMULSION INTRAVENOUS
Status: DISCONTINUED | OUTPATIENT
Start: 2024-10-09 | End: 2024-10-09 | Stop reason: SDUPTHER

## 2024-10-09 RX ORDER — IPRATROPIUM BROMIDE AND ALBUTEROL SULFATE 2.5; .5 MG/3ML; MG/3ML
1 SOLUTION RESPIRATORY (INHALATION)
Status: DISCONTINUED | OUTPATIENT
Start: 2024-10-09 | End: 2024-10-09 | Stop reason: HOSPADM

## 2024-10-09 RX ORDER — LABETALOL HYDROCHLORIDE 5 MG/ML
10 INJECTION, SOLUTION INTRAVENOUS
Status: DISCONTINUED | OUTPATIENT
Start: 2024-10-09 | End: 2024-10-09 | Stop reason: HOSPADM

## 2024-10-09 RX ORDER — ONDANSETRON 2 MG/ML
4 INJECTION INTRAMUSCULAR; INTRAVENOUS
Status: DISCONTINUED | OUTPATIENT
Start: 2024-10-09 | End: 2024-10-09 | Stop reason: HOSPADM

## 2024-10-09 RX ORDER — HALOPERIDOL 5 MG/ML
1 INJECTION INTRAMUSCULAR
Status: DISCONTINUED | OUTPATIENT
Start: 2024-10-09 | End: 2024-10-09 | Stop reason: HOSPADM

## 2024-10-09 RX ORDER — SODIUM CHLORIDE, SODIUM LACTATE, POTASSIUM CHLORIDE, CALCIUM CHLORIDE 600; 310; 30; 20 MG/100ML; MG/100ML; MG/100ML; MG/100ML
INJECTION, SOLUTION INTRAVENOUS CONTINUOUS
Status: DISCONTINUED | OUTPATIENT
Start: 2024-10-09 | End: 2024-10-09 | Stop reason: HOSPADM

## 2024-10-09 RX ORDER — GLYCOPYRROLATE 0.2 MG/ML
INJECTION INTRAMUSCULAR; INTRAVENOUS
Status: DISCONTINUED | OUTPATIENT
Start: 2024-10-09 | End: 2024-10-09 | Stop reason: SDUPTHER

## 2024-10-09 RX ORDER — PROCHLORPERAZINE EDISYLATE 5 MG/ML
5 INJECTION INTRAMUSCULAR; INTRAVENOUS
Status: DISCONTINUED | OUTPATIENT
Start: 2024-10-09 | End: 2024-10-09 | Stop reason: HOSPADM

## 2024-10-09 RX ORDER — DIPHENHYDRAMINE HYDROCHLORIDE 50 MG/ML
12.5 INJECTION INTRAMUSCULAR; INTRAVENOUS
Status: DISCONTINUED | OUTPATIENT
Start: 2024-10-09 | End: 2024-10-09 | Stop reason: HOSPADM

## 2024-10-09 RX ORDER — PROCHLORPERAZINE EDISYLATE 5 MG/ML
5 INJECTION INTRAMUSCULAR; INTRAVENOUS
Status: DISCONTINUED | OUTPATIENT
Start: 2024-10-09 | End: 2024-10-09 | Stop reason: SDUPTHER

## 2024-10-09 RX ORDER — LIDOCAINE HYDROCHLORIDE 5 MG/ML
INJECTION, SOLUTION INFILTRATION; INTRAVENOUS
Status: DISCONTINUED | OUTPATIENT
Start: 2024-10-09 | End: 2024-10-09 | Stop reason: SDUPTHER

## 2024-10-09 RX ORDER — BUPIVACAINE HYDROCHLORIDE 2.5 MG/ML
INJECTION, SOLUTION EPIDURAL; INFILTRATION; INTRACAUDAL PRN
Status: DISCONTINUED | OUTPATIENT
Start: 2024-10-09 | End: 2024-10-09 | Stop reason: ALTCHOICE

## 2024-10-09 RX ORDER — SODIUM CHLORIDE, SODIUM LACTATE, POTASSIUM CHLORIDE, CALCIUM CHLORIDE 600; 310; 30; 20 MG/100ML; MG/100ML; MG/100ML; MG/100ML
INJECTION, SOLUTION INTRAVENOUS
Status: DISCONTINUED | OUTPATIENT
Start: 2024-10-09 | End: 2024-10-09 | Stop reason: SDUPTHER

## 2024-10-09 RX ORDER — OXYCODONE HYDROCHLORIDE 5 MG/1
5 TABLET ORAL PRN
Status: DISCONTINUED | OUTPATIENT
Start: 2024-10-09 | End: 2024-10-09 | Stop reason: HOSPADM

## 2024-10-09 RX ORDER — NALOXONE HYDROCHLORIDE 0.4 MG/ML
INJECTION, SOLUTION INTRAMUSCULAR; INTRAVENOUS; SUBCUTANEOUS PRN
Status: DISCONTINUED | OUTPATIENT
Start: 2024-10-09 | End: 2024-10-09 | Stop reason: SDUPTHER

## 2024-10-09 RX ORDER — FENTANYL CITRATE 50 UG/ML
100 INJECTION, SOLUTION INTRAMUSCULAR; INTRAVENOUS
Status: DISCONTINUED | OUTPATIENT
Start: 2024-10-09 | End: 2024-10-09 | Stop reason: HOSPADM

## 2024-10-09 RX ADMIN — PROPOFOL 150 MCG/KG/MIN: 10 INJECTION, EMULSION INTRAVENOUS at 09:42

## 2024-10-09 RX ADMIN — SODIUM CHLORIDE, SODIUM LACTATE, POTASSIUM CHLORIDE, AND CALCIUM CHLORIDE: 600; 310; 30; 20 INJECTION, SOLUTION INTRAVENOUS at 09:35

## 2024-10-09 RX ADMIN — PROPOFOL 30 MG: 10 INJECTION, EMULSION INTRAVENOUS at 09:38

## 2024-10-09 RX ADMIN — GLYCOPYRROLATE 0.3 MG: 0.2 INJECTION INTRAMUSCULAR; INTRAVENOUS at 09:37

## 2024-10-09 RX ADMIN — LIDOCAINE HYDROCHLORIDE 250 MG: 5 INJECTION, SOLUTION INFILTRATION at 09:41

## 2024-10-09 RX ADMIN — Medication 2000 MG: at 09:45

## 2024-10-09 RX ADMIN — MIDAZOLAM 2 MG: 1 INJECTION INTRAMUSCULAR; INTRAVENOUS at 09:40

## 2024-10-09 RX ADMIN — PROPOFOL 30 MG: 10 INJECTION, EMULSION INTRAVENOUS at 09:39

## 2024-10-09 ASSESSMENT — LIFESTYLE VARIABLES: SMOKING_STATUS: 0

## 2024-10-09 ASSESSMENT — PAIN - FUNCTIONAL ASSESSMENT: PAIN_FUNCTIONAL_ASSESSMENT: 0-10

## 2024-10-09 ASSESSMENT — PAIN SCALES - GENERAL: PAINLEVEL_OUTOF10: 0

## 2024-10-09 NOTE — ANESTHESIA POSTPROCEDURE EVALUATION
Department of Anesthesiology  Postprocedure Note    Patient: Rian Harmon  MRN: [id number]  YOB: 1960  Date of evaluation: 10/9/2024    Procedure Summary       Date: 10/09/24 Room / Location: [facility] OP OR 07 / [facility]    Anesthesia Start: 0935 Anesthesia Stop: 1048    Procedure: Left ring finger Dupuytren's contracture release (Left: Hand) Diagnosis:       Dupuytren's contracture      (Dupuytren's contracture [M72.0])    Surgeons: Zonia Wharton MD Responsible Provider: Zackery Gomez MD    Anesthesia Type: TIVA ASA Status: 2            Anesthesia Type: TIVA    David Phase I: David Score: 8    David Phase II: David Score: 10    Anesthesia Post Evaluation    Patient location during evaluation: PACU  Patient participation: complete - patient participated  Level of consciousness: awake and alert  Airway patency: patent  Nausea & Vomiting: no nausea and no vomiting  Cardiovascular status: hemodynamically stable  Respiratory status: acceptable, nonlabored ventilation and spontaneous ventilation  Hydration status: euvolemic  Comments: /85   Pulse 58   Temp 98.4 °F (36.9 °C) (Tympanic)   Resp 18   Ht 1.854 m (6' 1\")   Wt 89.4 kg (197 lb)   SpO2 94%   BMI 25.99 kg/m²     Multimodal analgesia pain management approach  Pain management: adequate and satisfactory to patient        No notable events documented.

## 2024-10-09 NOTE — DISCHARGE INSTRUCTIONS
Postoperative  Instructions:      Weightbearing or Lifting:  You  are  not  allowed  to  lift  any  weight  or  bear  any  weight  on  the  surgical  extremity  until  cleared  by  your  surgeon.      Dressing  instructions:    Keep  your  dressing  and/or  splint  clean  and  dry  at  all  times.    It  will  be  removed  at  your  first  post-operative  appointment or therapy apppointment.    Your  stitches  will  be  removed  at  this  visit.      Showering  Instructions:  May  shower  But keep surgical dressing clean and dry until removed as explained above.      Pain  Control:  - You  have  been  given  a  prescription  to  be  taken  as  directed  for  post-operative  pain  control.    In  addition,  elevate  the  operative  extremity  above  the  heart  at  all  times  to  prevent  swelling  and  throbbing  pain.   - If you develop constipation while taking narcotic pain medications (Norco, Hydrocodone, Percocet, Oxycodone, Dilaudid, Hydromorphone) take  over-the-counter  Colace,  100mg  by  mouth  twice  a  Day.     - Nausea  is  a  common  side  effect  of  many  pain  medications.  You  will  want  to  eat something  before  taking  your  pain  medicine  to  help  prevent  Nausea.  - If  you  are  taking  a  prescription  pain  medication  that  contains  acetaminophen,  we  recommend  that  you  do  not  take  additional  over  the  counter  acetaminophen  (Tylenol®).      Other  pain  relieving  options:   - Using  a  cold  pack  to  ice  the  affected  area  a  few  times  a  day  (15  to  20  minutes  at  a  time)  can  help  to  relieve  pain,  reduce  swelling  and  bruising.      - Elevation  of  the  affected  area  can  also  help  to  reduce  pain  and  swelling.      Did  you  receive  a  nerve  Block?  A  nerve  block  can  provide  pain  relief  for  one  hour  to  two  days  after  your  surgery.  As  long  as  the  nerve  block  is  working,  you  will  experience  little  or  no

## 2024-10-09 NOTE — OP NOTE
ORTHOPAEDIC SURGICAL NOTE        Rian Harmon male 64 y.o.  [id number]   [unfilled]     PRE-OP DIAGNOSIS: Dupuytren's contracture [M72.0]   POST-OP DIAGNOSIS: * No post-op diagnosis entered *   LATERALITY: Left     Procedure(s):  Left ring finger Dupuytren's contracture release    SURGEON:   Zonia Wharton MD    ANESTHESIA: Regional     STAFF:    Circulator: Denisse Kelly RN  Scrub Person First: Brooke Mckenna     ESTIMATED BLOOD LOSS: Minimal       TOTAL IV FLUIDS : See anesthesia note    COMPLICATIONS: None     TOURNIQUET TIME:   Total Tourniquet Time Documented:  Arm  (Left) - 37 minutes  Total: Arm  (Left) - 37 minutes       INDICATION FOR PROCEDURE:     Rian Harmon  has a history of Dupuytrens contracture resulting in a 40 degree contracture of the ring MCP, 60 degree contracture of the ring PIP.  Surgical and non-surgical treatment options were discussed with the patient and their family, as well as the risk and benefits of each option. After thorough discussion, the patient decided to proceed with surgical management.  Specific to this treatment plan, we discussed in detail surgical risks including scar, pain, bleeding, infection, anesthetic risks, neurovascular injury, failure to achieve desired results, hardware problems, need for further surgery,  weakness, stiffness, risk of death and potential risk of other unforseen complication.       The patient consented to the procedure after discussion of the risks and benefits.         DESCRIPTION OF PROCEDURE:     The patient was identified in the holding room. The left ring finger was marked and confirmed as the correct operative site. They were then brought to the OR and transferred onto the OR table in the supine position. All bony prominences were well padded. SCDs were placed on the bilateral legs throughout the case. A timeout was performed, verifying the correct patient, the correct side  and the correct procedure. Antibiotics were then

## 2024-10-16 ENCOUNTER — EVALUATION (OUTPATIENT)
Age: 64
End: 2024-10-16
Payer: COMMERCIAL

## 2024-10-16 DIAGNOSIS — M25.642 STIFFNESS OF LEFT HAND JOINT: ICD-10-CM

## 2024-10-16 DIAGNOSIS — M79.645 PAIN OF FINGER OF LEFT HAND: ICD-10-CM

## 2024-10-16 DIAGNOSIS — M72.0 DUPUYTREN'S CONTRACTURE: Primary | ICD-10-CM

## 2024-10-16 PROCEDURE — 97166 OT EVAL MOD COMPLEX 45 MIN: CPT | Performed by: OCCUPATIONAL THERAPIST

## 2024-10-16 PROCEDURE — 97110 THERAPEUTIC EXERCISES: CPT | Performed by: OCCUPATIONAL THERAPIST

## 2024-10-16 PROCEDURE — L3913 HFO W/O JOINTS CF: HCPCS | Performed by: OCCUPATIONAL THERAPIST

## 2024-10-16 NOTE — PROGRESS NOTES
development    Follow up in 1 week(s) to assure optimal fit, comfort, and compliance.  Initiate regular intervention as indicated for ROM and strengthening.    The referring medical provider has reviewed and approved this evaluation and plan of care as noted by the electronic signature attached to note.    GOALS     Short term goals:  11/13/2024  (4 weeks)  Patient will be I with HEP and precautions.  Increase active composite digit flexion to at least 1 cm to DPC to improve ability to grasp.  Increase AROM of affected RF PIP extension to at least 5 ° to improve ability to open hand for ADL's.  Improve Quick DASH functional assessment score from less than 40% deficit.  Patient to demonstrate independence with donning/doffing orthosis in one visit, Patient to verbalize understanding of inspecting skin to prevent pressure areas and allergic reaction due to orthosis, Patient to verbalize understanding of precautions and necessity of wearing orthosis as indicated by therapist, and Patient to verbalize understanding of wound/pin care in one visit    Long term goals:  12/11/2024  (8 weeks)  Pt will be able to use the affected UE for all ADL's without difficulty.  Pt will have adequate strength to be able to hold and open containers without difficulty.  Pt will be able to make a full composite fist with the involved hand to enable to grasp and hold objects.  Pt will have functional active composite extension of affected side to be able to open hand to acquire items for ADL's.  Pt will lack 10 ° or less of PIP extension involved digit.  Minimal edema in involved digit.   Improve Quick DASH functional assessment score from less than 20% deficit.      [facility] Portal     OT Protocols

## 2024-10-18 ENCOUNTER — TREATMENT (OUTPATIENT)
Age: 64
End: 2024-10-18

## 2024-10-18 DIAGNOSIS — M79.645 PAIN OF FINGER OF LEFT HAND: Primary | ICD-10-CM

## 2024-10-18 DIAGNOSIS — M25.642 STIFFNESS OF LEFT HAND JOINT: ICD-10-CM

## 2024-10-18 NOTE — PROGRESS NOTES
[facility]  [address] [place] [id number]  Dept: [phone]      Occupational Therapy Initial Assessment     Referring MD: Zonia Wharton MD    Diagnosis:     ICD-10-CM    1. Pain of finger of left hand  M79.645       2. Stiffness of left hand joint  M25.642              History of injury/onset : 10/9/24 Left ring finger Dupuytren's contracture release    Payor: Payor: [employer] /  /  /  Billing pattern: Commercial- substantial/midpoint time each CPT  Total Direct Treatment Time: 35 min                       Total In Office Time: 50 min  Modifier needed: No  Episode visit count:  2     Preferred Name:  Rian       Avoid heavy gripping     SUBJECTIVE     Reports increased PIPJ soreness with too much compression in the orthosis.  OBJECTIVE     Functional Outcome Measures: Quick Dash  35 score=   54.54 % functional deficit    Skin Integrity: no signs of infection and incision closed, sutures remain present        Digital AROM:  IE RF 10/16/24    AROM    MCP 0/56    AROM      PIP 25/77  (0 passive ext)    AROM      DIP 0/36    Active flexion to DPC     ance provided: none    TREATMENT PROVIDED:  Hot Pack (36037)  to L hand prior to treatment for moist heat/tissue extensibility in preparation for treatment. Skin checked prior to application and post treatment with no visible skin issues and no pt complaints.  Therapeutic exercise (66207) x 25 min:    Passive RF extension/flexion by therapist  Manual Therapy (98497) x 10  minutes: Addressing soft tissue/joint restrictions and swelling of  LRF :    Joint mobilizations to LRF PIPJ , Grade I,II PIP mobilizations with distraction/oscillations in anterior and posterior direction prior to ROM for joint lubrication, pain management, ligament and tissue extensibility  Coban for edema management: education and issued for home       Access Code: [email or web address]  URL: [email or web address]  Date: 10/16/2024  Prepared by: Shanae

## 2024-10-21 ENCOUNTER — OFFICE VISIT (OUTPATIENT)
Age: 64
End: 2024-10-21

## 2024-10-21 ENCOUNTER — TREATMENT (OUTPATIENT)
Age: 64
End: 2024-10-21
Payer: COMMERCIAL

## 2024-10-21 DIAGNOSIS — M72.0 DUPUYTREN'S DISEASE: Primary | ICD-10-CM

## 2024-10-21 DIAGNOSIS — M25.642 STIFFNESS OF LEFT HAND JOINT: ICD-10-CM

## 2024-10-21 DIAGNOSIS — M79.645 PAIN OF FINGER OF LEFT HAND: Primary | ICD-10-CM

## 2024-10-21 PROCEDURE — 97010 HOT OR COLD PACKS THERAPY: CPT | Performed by: OCCUPATIONAL THERAPIST

## 2024-10-21 PROCEDURE — 97110 THERAPEUTIC EXERCISES: CPT | Performed by: OCCUPATIONAL THERAPIST

## 2024-10-21 PROCEDURE — 99024 POSTOP FOLLOW-UP VISIT: CPT | Performed by: ORTHOPAEDIC SURGERY

## 2024-10-21 NOTE — PROGRESS NOTES
Orthopaedic Hand Surgery Note    Name: Rian Harmon  YOB: 1960  Gender: male  MRN: [id number]    Post Operative Visit: Left ring finger Dupuytren's contracture release - Left    HPI: Patient is status post Left ring finger Dupuytren's contracture release - Left on 10/9/2024. Patient reports well controlled pain.    Physical Examination:  Surgical incision is clean, dry and intact.  Sutures are in place.  There is no erythema or drainage. Sensation is intact in all fingers. Motor exam reveals no deficits. .    Imaging:     none    Assessment:   1. Dupuytren's disease         Status post Left ring finger Dupuytren's contracture release - Left on 10/9/2024    Plan:  We discussed the post operative course and progression.  He has an appointment to continue hand therapy today.  He will continue use of his splint at nighttime and part-time during the day as well.  He can continue range of motion exercises.  He did not perform the light lifting with the left hand, under 2 pounds for the next 2 weeks, then can advance as tolerated.  I will see him back in 4 weeks        Zonia Wharton MD  10/21/24  12:45 PM

## 2024-10-22 NOTE — PROGRESS NOTES
[facility]  [address] [place] [id number]  Dept: [phone]      Occupational Therapy Initial Assessment     Referring MD: Friend, Zonia VELARDE MD    Diagnosis:   No diagnosis found.         History of injury/onset : 10/9/24 Left ring finger Dupuytren's contracture release    Payor: Payor: EBMS /  /  /  Billing pattern: Commercial- substantial/midpoint time each CPT  Total Direct Treatment Time: 35 min                       Total In Office Time: 50 min  Modifier needed: No  Episode visit count:  Visit count could not be calculated. Make sure you are using a visit which is associated with an episode.     Preferred Name:  Rian       Avoid heavy gripping     SUBJECTIVE     Reports increased PIPJ soreness with too much compression in the orthosis.  OBJECTIVE     Functional Outcome Measures: Quick Dash  35 score=   54.54 % functional deficit    Skin Integrity: no signs of infection and incision closed, sutures remain present        Digital AROM:  IE RF 10/16/24    AROM    MCP 0/56    AROM      PIP 25/77  (0 passive ext)    AROM      DIP 0/36    Active flexion to DPC     ance provided: none    TREATMENT PROVIDED:  Hot Pack (29833)  to L hand prior to treatment for moist heat/tissue extensibility in preparation for treatment. Skin checked prior to application and post treatment with no visible skin issues and no pt complaints.  Therapeutic exercise (74099) x 30 min:    Passive RF extension/flexion by therapist  MP blocked in flexion and IP active F/E       Access Code: [email or web address]  URL: [email or web address]  Date: 10/16/2024  Prepared by: Shanae Gonzalez    Exercises  - Seated Finger DIP Flexion AROM with Blocking  - 5 x daily - 7 x weekly - 2 sets - 15 reps - 3 sec hold  - Seated Isolated Finger PIP Flexion AROM  - 5 x daily - 7 x weekly - 2 sets - 15 reps - 3 sec hold  - Seated Straight Fist AROM  - 5 x daily - 7 x weekly - 2 sets - 15 reps - 3 sec hold  - Seated Full Fist

## 2024-10-24 ENCOUNTER — TELEPHONE (OUTPATIENT)
Dept: ORTHOPEDIC SURGERY | Age: 64
End: 2024-10-24

## 2024-10-24 NOTE — TELEPHONE ENCOUNTER
I called and spoke with patient.  He states he has a place in the corner of the zigzag of his incision that's elevated above the rest of the incision.  He states it was there when he saw Dr. Wharton and Shanae a few days ago.  Per Dr. Wharton, I called and spoke with patient.  Advised him that can be normal and it will go back down over time.  Patient voiced understanding.

## 2024-10-31 ENCOUNTER — TREATMENT (OUTPATIENT)
Age: 64
End: 2024-10-31

## 2024-10-31 DIAGNOSIS — M25.642 STIFFNESS OF LEFT HAND JOINT: Primary | ICD-10-CM

## 2024-10-31 DIAGNOSIS — M79.645 PAIN OF FINGER OF LEFT HAND: ICD-10-CM

## 2024-10-31 NOTE — PROGRESS NOTES
[email or web address]  Date: 10/16/2024  Prepared by: Shanae Gonzalez    Exercises  - Seated Finger DIP Flexion AROM with Blocking  - 5 x daily - 7 x weekly - 2 sets - 15 reps - 3 sec hold  - Seated Isolated Finger PIP Flexion AROM  - 5 x daily - 7 x weekly - 2 sets - 15 reps - 3 sec hold  - Seated Straight Fist AROM  - 5 x daily - 7 x weekly - 2 sets - 15 reps - 3 sec hold  - Seated Full Fist AROM  - 5 x daily - 7 x weekly - 2 sets - 15 reps - 3 sec hold  - Seated Claw Fist AROM  - 5 x daily - 7 x weekly - 2 sets - 15 reps - 3 sec hold  - Hand PROM Finger Extension  - 5 x daily - 7 x weekly - 1 sets - 10 reps - 10 sec hold    ASSESSMENT     Full extension present of PIP.  Dense scarring forming at base proximal phalank and the proximal incision.  Healing without signs of infection.  Significant gains flexion in clinic.  Less PIPJ pain over all.    PLAN OF CARE       Effective Dates/Duration: 10/16/2024 TO 12/15/2024 (60 days)   Frequency  1-2x a week      ROM, edema and pain management, tendon gliding scar management.  GOALS     Short term goals:  11/13/2024  (4 weeks)  Patient will be I with HEP and precautions.  Increase active composite digit flexion to at least 1 cm to DPC to improve ability to grasp.  Increase AROM of affected RF PIP extension to at least 5 ° to improve ability to open hand for ADL's.  Improve Quick DASH functional assessment score from less than 40% deficit.  Patient to demonstrate independence with donning/doffing orthosis in one visit, Patient to verbalize understanding of inspecting skin to prevent pressure areas and allergic reaction due to orthosis, Patient to verbalize understanding of precautions and necessity of wearing orthosis as indicated by therapist, and Patient to verbalize understanding of wound/pin care in one visit    Long term goals:  12/11/2024  (8 weeks)  Pt will be able to use the affected UE for all ADL's without difficulty.  Pt will have adequate strength

## 2024-11-07 ENCOUNTER — TREATMENT (OUTPATIENT)
Age: 64
End: 2024-11-07
Payer: COMMERCIAL

## 2024-11-07 DIAGNOSIS — M25.642 STIFFNESS OF LEFT HAND JOINT: Primary | ICD-10-CM

## 2024-11-07 DIAGNOSIS — M79.645 PAIN OF FINGER OF LEFT HAND: ICD-10-CM

## 2024-11-07 PROCEDURE — 97010 HOT OR COLD PACKS THERAPY: CPT | Performed by: OCCUPATIONAL THERAPIST

## 2024-11-07 PROCEDURE — 97110 THERAPEUTIC EXERCISES: CPT | Performed by: OCCUPATIONAL THERAPIST

## 2024-11-07 PROCEDURE — 97140 MANUAL THERAPY 1/> REGIONS: CPT | Performed by: OCCUPATIONAL THERAPIST

## 2024-11-07 NOTE — PROGRESS NOTES
[facility]  [address] [place] [id number]  Dept: [phone]      Occupational Therapy Daily Note     Referring MD: Friend, Zonia VELARDE MD    Diagnosis:     ICD-10-CM    1. Stiffness of left hand joint  M25.642       2. Pain of finger of left hand  M79.645           History of injury/onset : 10/9/24 Left ring finger Dupuytren's contracture release    Payor: Payor: EBMS /  /  /  Billing pattern: Commercial- substantial/midpoint time each CPT  Total Direct Treatment Time: 40 min                       Total In Office Time: 50 min  Modifier needed: No  Episode visit count:  5     Preferred Name:  Rian      SUBJECTIVE     Reports he is doing well, incision closed and he has been able to debride tissue, clean the area well.      OBJECTIVE     Functional Outcome Measures: Quick Dash  35 score=   54.54 % functional deficit    Skin Integrity: no signs of infection and incision closed, sutures remain present        Digital AROM:  IE RF 10/16/24    AROM    MCP 0/56    AROM      PIP 25/77  (0 passive ext)    AROM      DIP 0/36    Active flexion to DPC     ance provided: none    TREATMENT PROVIDED:  Hot Pack (96190)  to L hand prior to treatment for moist heat/tissue extensibility in preparation for treatment. Skin checked prior to application and post treatment with no visible skin issues and no pt complaints.  Therapeutic exercise (63447) x 15 min:    Passive RF extension/flexion by therapist  MP blocked in flexion and IP active F/E  Towel walks for finger ROM  Intrinsic drills  Hold small dowel, hook to fist and repeat  Handi helper for AAROM flexion  AROM flexion/extension hand    Manual Therapy (30458) x 25  minutes: Addressing soft tissue/joint restrictions and swelling of  LRF :    Joint mobilizations to LRF PIPJ , Grade I,II PIP mobilizations with distraction/oscillations in anterior and posterior direction prior to ROM for joint lubrication, pain management, ligament and tissue

## 2024-11-13 ENCOUNTER — TREATMENT (OUTPATIENT)
Age: 64
End: 2024-11-13

## 2024-11-13 DIAGNOSIS — M79.645 PAIN OF FINGER OF LEFT HAND: ICD-10-CM

## 2024-11-13 DIAGNOSIS — M25.642 STIFFNESS OF LEFT HAND JOINT: Primary | ICD-10-CM

## 2024-11-13 NOTE — PROGRESS NOTES
[facility]  [address] [place] [id number]  Dept: [phone]      Occupational Therapy Daily Note     Referring MD: Friend, Zonia VELARDE MD    Diagnosis:     ICD-10-CM    1. Stiffness of left hand joint  M25.642       2. Pain of finger of left hand  M79.645           History of injury/onset : 10/9/24 Left ring finger Dupuytren's contracture release    Payor: Payor: EBMS /  /  /  Billing pattern: Commercial- substantial/midpoint time each CPT  Total Direct Treatment Time: 38 min                       Total In Office Time: 50 min  Modifier needed: No  Episode visit count:  6     Preferred Name:  Rian      SUBJECTIVE     Continues to report doing well, compliant with all recs.    OBJECTIVE     Functional Outcome Measures: Quick Dash  35 score=   54.54 % functional deficit    Skin Integrity: no signs of infection and incision closed, sutures remain present        Digital AROM:  IE RF 10/16/24    AROM    MCP 0/56    AROM      PIP 25/77  (0 passive ext)    AROM      DIP 0/36    Active flexion to DPC       TREATMENT PROVIDED:    Fluidotherapy (46717) Therapist directed exercise in Fluidotherapy to left hand/wrist for preparation for tx and desensitization    Therapeutic exercise (55959) x 15 min:    Passive RF extension/flexion by therapist  Towel walks for finger ROM  Intrinsic drills  Hold small dowel, hook to fist and repeat  Handi helper for AAROM flexion  AROM flexion/extension hand    Ultrasound (98156) x 8 minutes including set up and application of modality to L palm, at 3.0 MHz, 1.0 w/cm2  continuous with gel assisting in reducing pain, muscle spasm/soft tissue restrictions.        Manual Therapy (81789) x 15  minutes: Addressing soft tissue/joint restrictions and swelling of  LRF :    Joint mobilizations to LRF PIPJ , Grade I,II PIP mobilizations with distraction/oscillations in anterior and posterior direction prior to ROM for joint lubrication, pain management, ligament and

## 2024-11-19 ENCOUNTER — TREATMENT (OUTPATIENT)
Age: 64
End: 2024-11-19

## 2024-11-19 DIAGNOSIS — M79.645 PAIN OF FINGER OF LEFT HAND: ICD-10-CM

## 2024-11-19 DIAGNOSIS — M25.642 STIFFNESS OF LEFT HAND JOINT: Primary | ICD-10-CM

## 2024-11-19 NOTE — PROGRESS NOTES
[facility]  [address] [place] [id number]  Dept: [phone]      Occupational Therapy Daily Note     Referring MD: Friend, Zonia VELARDE MD    Diagnosis:     ICD-10-CM    1. Stiffness of left hand joint  M25.642       2. Pain of finger of left hand  M79.645             History of injury/onset : 10/9/24 Left ring finger Dupuytren's contracture release    Payor: Payor: EBMS /  /  /  Billing pattern: Commercial- substantial/midpoint time each CPT  Total Direct Treatment Time: 38 min                       Total In Office Time: 50 min  Modifier needed: No  Episode visit count:  7     Preferred Name:  Rian      SUBJECTIVE     States tried some time without orthosis, noticed finger starting to flex more.  Some pain with trying to initiate use throughout the night last night.  OBJECTIVE     Functional Outcome Measures: Quick Dash  35 score=   54.54 % functional deficit    Skin Integrity: no signs of infection and incision closed, sutures remain present        Digital AROM:  IE RF 10/16/24    AROM    MCP 0/56    AROM      PIP 25/77  (0 passive ext)    AROM      DIP 0/36    Active flexion to DPC       TREATMENT PROVIDED:  Fluidotherapy (11765) Therapist directed exercise in Fluidotherapy to left hand/wrist for preparation for tx and desensitization    Therapeutic exercise (41769) x 15 min:    Passive RF extension/flexion by therapist  Intrinsic drills  Hold small dowel, hook to fist and repeat  Handi helper for AAROM flexion   and push dowel into green putty  AROM flexion/extension hand    Ultrasound (58816) x 8 minutes including set up and application of modality to L palm, at 3.0 MHz, 1.0 w/cm2  continuous with gel assisting in reducing pain, muscle spasm/soft tissue restrictions.        Manual Therapy (81506) x 15  minutes: Addressing soft tissue/joint restrictions and swelling of  LRF :    Joint mobilizations to LRF PIPJ , Grade I,II PIP mobilizations with distraction/oscillations in

## 2024-11-25 ENCOUNTER — TREATMENT (OUTPATIENT)
Age: 64
End: 2024-11-25

## 2024-11-25 DIAGNOSIS — M79.645 PAIN OF FINGER OF LEFT HAND: ICD-10-CM

## 2024-11-25 DIAGNOSIS — M25.642 STIFFNESS OF LEFT HAND JOINT: Primary | ICD-10-CM

## 2024-11-25 NOTE — PROGRESS NOTES
[facility]  [address] [place] [phone]  Dept: [phone]      Occupational Therapy Daily Note     Referring MD: Friend, Zonia VELARDE MD    Diagnosis:     ICD-10-CM    1. Stiffness of left hand joint  M25.642       2. Pain of finger of left hand  M79.645           History of injury/onset : 10/9/24 Left ring finger Dupuytren's contracture release    Payor: Payor: EBMS /  /  /  Billing pattern: Commercial- substantial/midpoint time each CPT  Total Direct Treatment Time: 38 min                       Total In Office Time: 50 min  Modifier needed: No  Episode visit count:  Visit count could not be calculated. Make sure you are using a visit which is associated with an episode.     Preferred Name:  Rian PAULSON     Is still utilizing the extension orthosis to gain and maintain full finger extension.  But is out of it for most of the daytime trying to use the hand as much as possible to gain flexion as well.  He has insurance concerns and may decrease frequency or discharge depending on outcome of coverage.  Trial of LMB in office, not providing significant lengthening sensation compared to custom HFO per report.  Patient voiced he is fine to continue with HFO on/off during the day as needed and states that once removed, he has near full extension present.    OBJECTIVE     Functional Outcome Measures: Quick Dash  35 score=   54.54 % functional deficit        Digital AROM:  IE RF 10/16/24 RF 11/25/24   AROM    MCP 0/56    AROM      PIP 25/77  (0 passive ext) 34/91   AROM      DIP 0/36    Active flexion to DPC       TREATMENT PROVIDED:  Fluidotherapy (09532) Therapist directed exercise in Fluidotherapy to left hand/wrist for preparation for tx and desensitization    Therapeutic exercise (97786) x 25 min:    Passive RF extension/flexion by therapist    Ultrasound (00060) x 8 minutes including set up and application of modality to L palm, at 3.0 MHz, 1.0 w/cm2  continuous with gel

## 2024-12-09 ENCOUNTER — TELEPHONE (OUTPATIENT)
Dept: ORTHOPEDIC SURGERY | Age: 64
End: 2024-12-09

## (undated) DEVICE — BNDG,ELSTC,MATRIX,STRL,2"X5YD,LF,HOOK&LP: Brand: MEDLINE

## (undated) DEVICE — ZIMMER® STERILE DISPOSABLE TOURNIQUET CUFF WITH PLC, DUAL PORT, SINGLE BLADDER, 18 IN. (46 CM)

## (undated) DEVICE — SYRINGE IRRIG 60ML SFT PLIABLE BLB EZ TO GRP 1 HND USE W/

## (undated) DEVICE — STRIP,CLOSURE,WOUND,MEDI-STRIP,1/2X4: Brand: MEDLINE

## (undated) DEVICE — PADDING CAST W3INXL4YD COT BLEND MIC PLEAT UNDERCAST SPEC

## (undated) DEVICE — SUTURE N ABSRB L 18 IN SZ 4-0 NDL L 19 MM NYL MONOFILAMENT

## (undated) DEVICE — HAND PACK: Brand: MEDLINE INDUSTRIES, INC.

## (undated) DEVICE — DISPOSABLE BIPOLAR CODE, 12' (3.66 M): Brand: CONMED

## (undated) DEVICE — DRESSING,GAUZE,XEROFORM,CURAD,1"X8",ST: Brand: CURAD

## (undated) DEVICE — DRAPE,U/SHT,SPLIT,FILM,60X84,STERILE: Brand: MEDLINE

## (undated) DEVICE — RUBBERBAND FASTENING W0.25XL3.5IN 5 PER PK

## (undated) DEVICE — GLOVE SURG SZ 65 L12IN FNGR THK79MIL GRN LTX FREE

## (undated) DEVICE — DEVICE ULTRAGUIDE TFR HANDHELD SINGLE USE

## (undated) DEVICE — GLOVE SURG SZ 65 THK91MIL LTX FREE SYN POLYISOPRENE